# Patient Record
(demographics unavailable — no encounter records)

---

## 2025-04-06 NOTE — HISTORY OF PRESENT ILLNESS
[FreeTextEntry1] : MANUEL is a 5 year old M who presents to clinic today for initial evaluation of asthma.    INITIAL VISIT: Visit Date: 04/08/2025   Age of Onset of Symptoms: PMH: PSH: Meds: Birth Hx: PCP/Specialists:   Cough Hx: Triggers: Allergies: Hx of wheezing: GINA Use: Use of oral steroids: ED/Hospitalizations: Daytime cough: Nighttime cough: Respiratory symptoms with exercise: Chest x-ray:   Family hx: Mom- Dad-  Family hx of asthma: Family hx of cystic fibrosis, autoimmune disease, recurrent respiratory infections: Feeding issues, WILL: EDWIN Sx, Snoring/Gasping/Pauses: Hx of Eczema: Hx of rhinitis, post nasal drip: Hx of recurrent infections (ie: pneumonia, AOM, sinusitis): Seen by pulmonologist before:   Vaccines UTD: Influenza Vaccine: COVID-19 Vaccine: H/o COVID19/RSV infection:   Modified Asthma Predictive Index (mAPI): 4 wheezing illnesses AND 1 major criteria Parental asthma: NO atopic dermatitis: NO Aeroallergen sensitization suspected: NO   OR   2 minor criteria Food sensitization: NO Peripheral blood eosinophilia = % Wheezing apart from colds: NO